# Patient Record
(demographics unavailable — no encounter records)

---

## 2024-10-31 NOTE — ASSESSMENT
[FreeTextEntry1] : 69 y.o. male with hx of HFmEF (40-45%) paroxysmal atrial fibrillation post ablation by Dr Spivey, frequent APCs and PVCs, carotid disease, hypertension, hyperlipidemia, here in the office for a follow up visit  Patient denies chest pain, no RODRIGUEZ, no PND, no orthopnea, no leg edema, no claudication, no syncope.   #aFIB post ablation currently in sinus continue AC and BB  #NSVT, high burden of PVCs continue with BB  #HFmEF euvolemic on farxiga.  #Carotid disease on lipitor   RTC 6 months

## 2024-10-31 NOTE — CARDIOLOGY SUMMARY
[de-identified] : 12/6/23: NSR, anterolateral YINKA/ repolarization abnormalities.  4/26/24: NSR, anterolateral YINKA/ repolarization abnormalities. 10/31/24: NSR, poor R wave progression, early repolarization abnormalities. [de-identified] : [facility] 5/16/23-5/22-23: AFib burden 99% HR  bpm, NSVT longest 11 secs, fastest  bpm, PVC burden 12%. No SVT, no heart blocks, No Pauses, No PACs [de-identified] : [facility] nuclear stress test 3/9/22: Normal perfusion, no evidence of\par  ischemia or infarction, normal LV function, EF 67%, diaphragmatic attenuation\par  artifact is present involving the inferior wall, APCs noted. [de-identified] : Echocardiogram 3/15/23; mildly decreased LV function, EF 40-45%. The presence\par  of arrhythmia precludes accurate analysis of left ventricular ejection fraction. Mild MR, trace OH, mild TR. [de-identified] : Carotid duplex scan 3/15/23; mild 1-15% stenosis bilaterally.

## 2024-10-31 NOTE — PHYSICAL EXAM

## 2024-10-31 NOTE — HISTORY OF PRESENT ILLNESS
[FreeTextEntry1] : 69 y.o. male with hx of HFmEF (40-45%) paroxysmal atrial fibrillation post ablation by Dr Spivey, frequent APCs and PVCs, carotid disease, hypertension, hyperlipidemia, here in the office for a follow up visit  Patient denies chest pain, no RODRIGUEZ, no PND, no orthopnea, no leg edema, no claudication, no syncope.

## 2025-04-10 NOTE — CARDIOLOGY SUMMARY
[de-identified] : 4/26/24: NSR, anterolateral YINKA/ repolarization abnormalities. 10/31/24: NSR, poor R wave progression, early repolarization abnormalities. 4/10/25: NSR, PVCs, inferior Q waves [de-identified] : [facility] 5/16/23-5/22-23: AFib burden 99% HR  bpm, NSVT longest 11 secs, fastest  bpm, PVC burden 12%. No SVT, no heart blocks, No Pauses, No PACs [de-identified] : Pers[facility] nuclear stress test 3/9/22: Normal perfusion, no evidence of\par  ischemia or infarction, normal LV function, EF 67%, diaphragmatic attenuation\par  artifact is present involving the inferior wall, APCs noted. [de-identified] : Echocardiogram 3/15/23; mildly decreased LV function, EF 40-45%. The presence\par  of arrhythmia precludes accurate analysis of left ventricular ejection fraction. Mild MR, trace [place], mild TR. [de-identified] : Carotid duplex scan 3/15/23; mild 1-15% stenosis bilaterally.

## 2025-04-10 NOTE — HISTORY OF PRESENT ILLNESS
[FreeTextEntry1] : 70 y.o. male with hx of HFmEF (40-45%) paroxysmal atrial fibrillation post ablation by Dr Spivey, frequent APCs and PVCs, carotid disease, hypertension, hyperlipidemia, here in the office for a follow up visit  Patient denies chest pain, palpitations, RODRIGUEZ, PND, orthopnea, leg edema, claudication, no syncope.

## 2025-04-10 NOTE — PHYSICAL EXAM

## 2025-04-10 NOTE — ASSESSMENT
[FreeTextEntry1] : 70 y.o. male with hx of HFmEF (40-45%) paroxysmal atrial fibrillation post ablation by Dr Spivey, frequent APCs and PVCs, carotid disease, hypertension, hyperlipidemia, here in the office for a follow up visit  Patient denies chest pain, palpitations, RODRIGUEZ, PND, orthopnea, leg edema, claudication, no syncope.   #aFIB post ablation currently in sinus continue AC and BB  #NSVT, high burden of PVCs continue with BB  #HFmEF euvolemic on farxiga. Echocardiogram ordered  #Carotid disease on lipitor   RTC 6 months

## 2025-05-15 NOTE — ASSESSMENT
[FreeTextEntry1] : 70 y.o. male with hx of HFmEF (40-45%) paroxysmal atrial fibrillation post ablation by Dr Spivey, frequent APCs and PVCs, carotid disease, hypertension, hyperlipidemia, here in the office for a follow up visit  On his last visit patient was ordered a TTE to follow up on his CMP.  Patient denies chest pain, palpitations, RODRIGUEZ, PND, orthopnea, leg edema, claudication, no syncope.   #aFIB post ablation currently in sinus continue AC and BB  #NSVT, high burden of PVCs continue with BB  #HFmEF Improved EF, currently 60-65% euvolemic on farxiga.   #Carotid disease on lipitor   RTC 6 months

## 2025-05-15 NOTE — CARDIOLOGY SUMMARY
[de-identified] : 10/31/24: NSR, poor R wave progression, early repolarization abnormalities. 4/10/25: NSR, PVCs, inferior Q waves 5/15/25: NSR, poor r wave progression. [de-identified] : [facility] 5/16/23-5/22-23: AFib burden 99% HR  bpm, NSVT longest 11 secs, fastest  bpm, PVC burden 12%. No SVT, no heart blocks, No Pauses, No PACs [de-identified] : [facility] nuclear stress test 3/9/22: Normal perfusion, no evidence of\par  ischemia or infarction, normal LV function, EF 67%, diaphragmatic attenuation\par  artifact is present involving the inferior wall, APCs noted. [de-identified] : Echocardiogram 3/15/23; mildly decreased LV function, EF 40-45%. The presence of arrhythmia precludes accurate analysis of left ventricular ejection fraction. Mild MR, trace WI, mild TR.  Echo 4/25/25: 1. Left ventricular systolic function is normal with an ejection fraction visually estimated at 60 to 65 %. There are no regional wall motion abnormalities seen. 2. The left ventricular diastolic function is indeterminate. 3. Normal right ventricular cavity size and normal right ventricular systolic function. 4. Left atrium is mildly dilated. 5. The right atrium is mildly dilated. 6. Mild pulmonic regurgitation. [de-identified] : Carotid duplex scan 3/15/23; mild 1-15% stenosis bilaterally.

## 2025-05-15 NOTE — PHYSICAL EXAM

## 2025-05-15 NOTE — HISTORY OF PRESENT ILLNESS
[FreeTextEntry1] : 70 y.o. male with hx of HFmEF (40-45%) paroxysmal atrial fibrillation post ablation by Dr Spivey, frequent APCs and PVCs, carotid disease, hypertension, hyperlipidemia, here in the office for a follow up visit  On his last visit patient was ordered a TTE to follow up on his CMP.  Patient denies chest pain, palpitations, RODRIGUEZ, PND, orthopnea, leg edema, claudication, no syncope.

## 2025-05-15 NOTE — REASON FOR VISIT
[Cardiac Failure] : cardiac failure [Arrhythmia/ECG Abnorrmalities] : arrhythmia/ECG abnormalities [Carotid, Aortic and Peripheral Vascular Disease] : carotid, aortic and peripheral vascular disease